# Patient Record
Sex: MALE | Race: BLACK OR AFRICAN AMERICAN
[De-identification: names, ages, dates, MRNs, and addresses within clinical notes are randomized per-mention and may not be internally consistent; named-entity substitution may affect disease eponyms.]

---

## 2018-03-11 ENCOUNTER — HOSPITAL ENCOUNTER (EMERGENCY)
Dept: HOSPITAL 92 - ERS | Age: 23
Discharge: HOME | End: 2018-03-11
Payer: COMMERCIAL

## 2018-03-11 DIAGNOSIS — J06.9: Primary | ICD-10-CM

## 2018-03-11 DIAGNOSIS — Z71.6: ICD-10-CM

## 2018-03-11 PROCEDURE — 99406 BEHAV CHNG SMOKING 3-10 MIN: CPT

## 2018-03-11 PROCEDURE — 71046 X-RAY EXAM CHEST 2 VIEWS: CPT

## 2018-03-11 NOTE — RAD
2 VIEWS CHEST:

 

Date:  03/11/18 

 

PROVIDED CLINICAL HISTORY:   

Cough. 

 

FINDINGS:

Cardiac and mediastinal silhouette is within normal limits. Lungs appear clear. No pleural fluid or p
neumothorax apparent. 

 

IMPRESSION: 

No evidence for acute cardiopulmonary process. 

 

 

POS: [facility]